# Patient Record
Sex: FEMALE | Race: WHITE | NOT HISPANIC OR LATINO | Employment: OTHER | URBAN - METROPOLITAN AREA
[De-identification: names, ages, dates, MRNs, and addresses within clinical notes are randomized per-mention and may not be internally consistent; named-entity substitution may affect disease eponyms.]

---

## 2017-05-03 ENCOUNTER — COMMUNICATION - HEALTHEAST (OUTPATIENT)
Dept: FAMILY MEDICINE | Facility: CLINIC | Age: 37
End: 2017-05-03

## 2017-09-27 ENCOUNTER — OFFICE VISIT - HEALTHEAST (OUTPATIENT)
Dept: FAMILY MEDICINE | Facility: CLINIC | Age: 37
End: 2017-09-27

## 2017-09-27 ENCOUNTER — COMMUNICATION - HEALTHEAST (OUTPATIENT)
Dept: FAMILY MEDICINE | Facility: CLINIC | Age: 37
End: 2017-09-27

## 2017-09-27 DIAGNOSIS — R10.2 PELVIC PAIN: ICD-10-CM

## 2017-09-27 DIAGNOSIS — N92.0 MENORRHAGIA: ICD-10-CM

## 2017-09-27 ASSESSMENT — MIFFLIN-ST. JEOR: SCORE: 1329.53

## 2017-09-28 ENCOUNTER — HOSPITAL ENCOUNTER (OUTPATIENT)
Dept: ULTRASOUND IMAGING | Facility: CLINIC | Age: 37
Discharge: HOME | End: 2017-09-28
Attending: FAMILY MEDICINE

## 2017-09-28 DIAGNOSIS — R10.2 PELVIC PAIN: ICD-10-CM

## 2018-01-31 ENCOUNTER — COMMUNICATION - HEALTHEAST (OUTPATIENT)
Dept: FAMILY MEDICINE | Facility: CLINIC | Age: 38
End: 2018-01-31

## 2018-02-23 ENCOUNTER — COMMUNICATION - HEALTHEAST (OUTPATIENT)
Dept: HEALTH INFORMATION MANAGEMENT | Facility: CLINIC | Age: 38
End: 2018-02-23

## 2018-02-23 ENCOUNTER — COMMUNICATION - HEALTHEAST (OUTPATIENT)
Dept: TELEHEALTH | Facility: CLINIC | Age: 38
End: 2018-02-23

## 2018-07-17 ENCOUNTER — COMMUNICATION - HEALTHEAST (OUTPATIENT)
Dept: FAMILY MEDICINE | Facility: CLINIC | Age: 38
End: 2018-07-17

## 2018-10-29 ENCOUNTER — RECORDS - HEALTHEAST (OUTPATIENT)
Dept: ADMINISTRATIVE | Facility: OTHER | Age: 38
End: 2018-10-29

## 2019-01-29 ENCOUNTER — RECORDS - HEALTHEAST (OUTPATIENT)
Dept: ADMINISTRATIVE | Facility: OTHER | Age: 39
End: 2019-01-29

## 2019-02-05 ENCOUNTER — RECORDS - HEALTHEAST (OUTPATIENT)
Dept: ADMINISTRATIVE | Facility: OTHER | Age: 39
End: 2019-02-05

## 2019-03-28 ENCOUNTER — COMMUNICATION - HEALTHEAST (OUTPATIENT)
Dept: FAMILY MEDICINE | Facility: CLINIC | Age: 39
End: 2019-03-28

## 2019-03-28 DIAGNOSIS — N92.6 MENSTRUAL PROBLEM: ICD-10-CM

## 2019-04-16 ENCOUNTER — RECORDS - HEALTHEAST (OUTPATIENT)
Dept: ADMINISTRATIVE | Facility: OTHER | Age: 39
End: 2019-04-16

## 2019-04-19 ENCOUNTER — COMMUNICATION - HEALTHEAST (OUTPATIENT)
Dept: FAMILY MEDICINE | Facility: CLINIC | Age: 39
End: 2019-04-19

## 2019-04-24 ENCOUNTER — RECORDS - HEALTHEAST (OUTPATIENT)
Dept: ADMINISTRATIVE | Facility: OTHER | Age: 39
End: 2019-04-24

## 2019-04-30 ENCOUNTER — OFFICE VISIT - HEALTHEAST (OUTPATIENT)
Dept: FAMILY MEDICINE | Facility: CLINIC | Age: 39
End: 2019-04-30

## 2019-04-30 DIAGNOSIS — Z01.818 ENCOUNTER FOR PREOPERATIVE EXAMINATION FOR GENERAL SURGICAL PROCEDURE: ICD-10-CM

## 2019-04-30 DIAGNOSIS — N92.0 MENORRHAGIA WITH REGULAR CYCLE: ICD-10-CM

## 2019-04-30 LAB
ANION GAP SERPL CALCULATED.3IONS-SCNC: 9 MMOL/L (ref 5–18)
BUN SERPL-MCNC: 10 MG/DL (ref 8–22)
CALCIUM SERPL-MCNC: 9.9 MG/DL (ref 8.5–10.5)
CHLORIDE BLD-SCNC: 106 MMOL/L (ref 98–107)
CO2 SERPL-SCNC: 26 MMOL/L (ref 22–31)
CREAT SERPL-MCNC: 0.74 MG/DL (ref 0.6–1.1)
ERYTHROCYTE [DISTWIDTH] IN BLOOD BY AUTOMATED COUNT: 12.3 % (ref 11–14.5)
GFR SERPL CREATININE-BSD FRML MDRD: >60 ML/MIN/1.73M2
GLUCOSE BLD-MCNC: 89 MG/DL (ref 70–125)
HCG UR QL: NEGATIVE
HCT VFR BLD AUTO: 42.1 % (ref 35–47)
HGB BLD-MCNC: 13.9 G/DL (ref 12–16)
MCH RBC QN AUTO: 30.1 PG (ref 27–34)
MCHC RBC AUTO-ENTMCNC: 33 G/DL (ref 32–36)
MCV RBC AUTO: 91 FL (ref 80–100)
PLATELET # BLD AUTO: 224 THOU/UL (ref 140–440)
PMV BLD AUTO: 8.8 FL (ref 7–10)
POTASSIUM BLD-SCNC: 4.5 MMOL/L (ref 3.5–5)
RBC # BLD AUTO: 4.63 MILL/UL (ref 3.8–5.4)
SODIUM SERPL-SCNC: 141 MMOL/L (ref 136–145)
WBC: 6 THOU/UL (ref 4–11)

## 2019-04-30 ASSESSMENT — MIFFLIN-ST. JEOR: SCORE: 1338.6

## 2019-05-08 ENCOUNTER — RECORDS - HEALTHEAST (OUTPATIENT)
Dept: ADMINISTRATIVE | Facility: OTHER | Age: 39
End: 2019-05-08

## 2019-08-08 ENCOUNTER — RECORDS - HEALTHEAST (OUTPATIENT)
Dept: ADMINISTRATIVE | Facility: OTHER | Age: 39
End: 2019-08-08

## 2019-08-14 ENCOUNTER — OFFICE VISIT - HEALTHEAST (OUTPATIENT)
Dept: FAMILY MEDICINE | Facility: CLINIC | Age: 39
End: 2019-08-14

## 2019-08-14 DIAGNOSIS — Z23 NEED FOR PROPHYLACTIC VACCINATION AGAINST HEPATITIS A: ICD-10-CM

## 2019-08-14 DIAGNOSIS — Z01.818 PREOP GENERAL PHYSICAL EXAM: ICD-10-CM

## 2019-08-14 LAB
ERYTHROCYTE [DISTWIDTH] IN BLOOD BY AUTOMATED COUNT: 11.9 % (ref 11–14.5)
HCT VFR BLD AUTO: 41.7 % (ref 35–47)
HGB BLD-MCNC: 14.4 G/DL (ref 12–16)
MCH RBC QN AUTO: 30.5 PG (ref 27–34)
MCHC RBC AUTO-ENTMCNC: 34.6 G/DL (ref 32–36)
MCV RBC AUTO: 88 FL (ref 80–100)
PLATELET # BLD AUTO: 213 THOU/UL (ref 140–440)
PMV BLD AUTO: 8.8 FL (ref 7–10)
RBC # BLD AUTO: 4.72 MILL/UL (ref 3.8–5.4)
WBC: 5.8 THOU/UL (ref 4–11)

## 2019-08-14 ASSESSMENT — MIFFLIN-ST. JEOR: SCORE: 1351.06

## 2019-08-21 ENCOUNTER — COMMUNICATION - HEALTHEAST (OUTPATIENT)
Dept: FAMILY MEDICINE | Facility: CLINIC | Age: 39
End: 2019-08-21

## 2019-09-12 ENCOUNTER — RECORDS - HEALTHEAST (OUTPATIENT)
Dept: ADMINISTRATIVE | Facility: OTHER | Age: 39
End: 2019-09-12

## 2021-01-25 ENCOUNTER — OFFICE VISIT - HEALTHEAST (OUTPATIENT)
Dept: FAMILY MEDICINE | Facility: CLINIC | Age: 41
End: 2021-01-25

## 2021-01-25 DIAGNOSIS — N92.6 MENSTRUAL PROBLEM: ICD-10-CM

## 2021-01-25 DIAGNOSIS — Z13.6 SCREENING FOR CARDIOVASCULAR CONDITION: ICD-10-CM

## 2021-01-25 DIAGNOSIS — Z13.1 SCREENING FOR DIABETES MELLITUS: ICD-10-CM

## 2021-01-25 DIAGNOSIS — Z12.4 SCREENING FOR MALIGNANT NEOPLASM OF CERVIX: ICD-10-CM

## 2021-01-25 DIAGNOSIS — Z00.00 ROUTINE ADULT HEALTH MAINTENANCE: ICD-10-CM

## 2021-01-25 LAB
CHOLEST SERPL-MCNC: 159 MG/DL
ERYTHROCYTE [DISTWIDTH] IN BLOOD BY AUTOMATED COUNT: 11.5 % (ref 11–14.5)
FASTING STATUS PATIENT QL REPORTED: YES
FASTING STATUS PATIENT QL REPORTED: YES
GLUCOSE BLD-MCNC: 90 MG/DL (ref 79–116)
HCT VFR BLD AUTO: 43.1 % (ref 35–47)
HDLC SERPL-MCNC: 72 MG/DL
HGB BLD-MCNC: 14 G/DL (ref 12–16)
LDLC SERPL CALC-MCNC: 77 MG/DL
MCH RBC QN AUTO: 30.1 PG (ref 27–34)
MCHC RBC AUTO-ENTMCNC: 32.5 G/DL (ref 32–36)
MCV RBC AUTO: 93 FL (ref 80–100)
PLATELET # BLD AUTO: 225 THOU/UL (ref 140–440)
PMV BLD AUTO: 8.6 FL (ref 7–10)
RBC # BLD AUTO: 4.65 MILL/UL (ref 3.8–5.4)
TRIGL SERPL-MCNC: 52 MG/DL
WBC: 7 THOU/UL (ref 4–11)

## 2021-01-25 ASSESSMENT — MIFFLIN-ST. JEOR: SCORE: 1267.16

## 2021-01-26 LAB
HPV SOURCE: NORMAL
HUMAN PAPILLOMA VIRUS 16 DNA: NEGATIVE
HUMAN PAPILLOMA VIRUS 18 DNA: NEGATIVE
HUMAN PAPILLOMA VIRUS FINAL DIAGNOSIS: NORMAL
HUMAN PAPILLOMA VIRUS OTHER HR: NEGATIVE
SPECIMEN DESCRIPTION: NORMAL

## 2021-02-03 LAB
BKR LAB AP ABNORMAL BLEEDING: NO
BKR LAB AP BIRTH CONTROL/HORMONES: NORMAL
BKR LAB AP CERVICAL APPEARANCE: NORMAL
BKR LAB AP GYN ADEQUACY: NORMAL
BKR LAB AP GYN INTERPRETATION: NORMAL
BKR LAB AP HPV REFLEX: NORMAL
BKR LAB AP LMP: NORMAL
BKR LAB AP PATIENT STATUS: NORMAL
BKR LAB AP PREVIOUS ABNORMAL: NORMAL
BKR LAB AP PREVIOUS NORMAL: 2018
HIGH RISK?: NO
PATH REPORT.COMMENTS IMP SPEC: NORMAL
RESULT FLAG (HE HISTORICAL CONVERSION): NORMAL

## 2021-02-08 ENCOUNTER — COMMUNICATION - HEALTHEAST (OUTPATIENT)
Dept: OBGYN | Facility: CLINIC | Age: 41
End: 2021-02-08

## 2021-02-15 ENCOUNTER — COMMUNICATION - HEALTHEAST (OUTPATIENT)
Dept: FAMILY MEDICINE | Facility: CLINIC | Age: 41
End: 2021-02-15

## 2021-05-12 ENCOUNTER — RECORDS - HEALTHEAST (OUTPATIENT)
Dept: ADMINISTRATIVE | Facility: OTHER | Age: 41
End: 2021-05-12

## 2021-05-28 NOTE — PROGRESS NOTES
Preoperative Exam    Scheduled Procedure: Uterine Ablation  Surgery Date:  05/08/19  Surgery Location: [facility], fax [phone]    Surgeon:  Dr. Villanueva    Assessment/Plan:     1. Encounter for preoperative examination for general surgical procedure  Basic Metabolic Panel    HM2(CBC w/o Differential)    Pregnancy (Beta-hCG, Qual), Urine   2. Menorrhagia with regular cycle  Basic Metabolic Panel    HM2(CBC w/o Differential)    Pregnancy (Beta-hCG, Qual), Urine       1. Encounter for preoperative examination for general surgical procedure    - Basic Metabolic Panel  - HM2(CBC w/o Differential)  - Pregnancy (Beta-hCG, Qual), Urine    2. Menorrhagia with regular cycle    - Basic Metabolic Panel  - HM2(CBC w/o Differential)  - Pregnancy (Beta-hCG, Qual), Urine        Surgical Procedure Risk: Low (reported cardiac risk generally < 1%)  Have you had prior anesthesia?: Yes  Have you or any family members had a previous anesthesia reaction:  No  Do you or any family members have a history of a clotting or bleeding disorder?: No  Cardiac Risk Assessment: no increased risk for major cardiac complications    Patient approved for surgery with general or local anesthesia.    Please Note:  none, has used bite guard in the past for TMJ    Functional Status: Independent  Patient plans to recover at home with family.     Subjective:      Delia Yusuf is a 38 y.o. female who presents for a preoperative consultation for uterine ablation.  Date of surgery: May 8 at the [facility].  Patient has been dealing with heavy and painful periods for the last 5 years.  She has a total of 4 children all delivered vaginally.  Does have a history of ovarian cyst formation.  Her periods, she does experience fatigue with intermittent dizziness, she does take iron supplementation as needed as well.  She did try oral contraception back in 2017 for a total of 6 months but she did not experience any changes with the  heaviness or severity of her cramping on the oral contraception.  She has been using the diva cup for well over one year.  Over the last year, she has now started wearing a pad with her diva cup because her menstrual periods have become so heavy.  Menstrual period April 24, 2019.  Initial consult for discussing uterine ablation completed roughly 10 days ago with Dr. Villanueva who will be performing the procedure.  Pap smear was completed at her initial consult as well as some additional lab work to rule out any clotting disorders due to her daughter suffering a stroke in utero.  She takes no prescription medications, no history of hypertension, pulmonary embolism or DVT formation.  Current BMI 26.9.  She is a non-smoker.  Vital signs are stable today.    All other systems reviewed and are negative, other than those listed in the HPI.    Pertinent History  Do you have difficulty breathing or chest pain after walking up a flight of stairs: No  History of obstructive sleep apnea: No  Steroid use in the last 6 months: No  Frequent Aspirin/NSAID use: No  Prior Blood Transfusion: No  Prior Blood Transfusion Reaction: No  If for some reason prior to, during or after the procedure, if it is medically indicated, would you be willing to have a blood transfusion?:  There is no transfusion refusal.    No current outpatient medications on file.     No current facility-administered medications for this visit.         No Known Allergies    Patient Active Problem List   Diagnosis   (none) - all problems resolved or deleted       History reviewed. No pertinent past medical history.    Past Surgical History:   Procedure Laterality Date     VT REMOVAL OF OVARIAN CYST(S) Right 3/2006    [place], [place]  Dr Sowmya Tello     WISDOM TOOTH EXTRACTION         Social History     Socioeconomic History     Marital status:      Spouse name: Manuel     Number of children: 4     Years of education: Not on file     Highest  "education level: Not on file   Occupational History     Occupation: Homemaker   Social Needs     Financial resource strain: Not on file     Food insecurity:     Worry: Not on file     Inability: Not on file     Transportation needs:     Medical: Not on file     Non-medical: Not on file   Tobacco Use     Smoking status: Never Smoker     Smokeless tobacco: Never Used   Substance and Sexual Activity     Alcohol use: Yes     Drug use: No     Sexual activity: Yes     Partners: Male   Lifestyle     Physical activity:     Days per week: Not on file     Minutes per session: Not on file     Stress: Not on file   Relationships     Social connections:     Talks on phone: Not on file     Gets together: Not on file     Attends Hindu service: Not on file     Active member of club or organization: Not on file     Attends meetings of clubs or organizations: Not on file     Relationship status: Not on file     Intimate partner violence:     Fear of current or ex partner: Not on file     Emotionally abused: Not on file     Physically abused: Not on file     Forced sexual activity: Not on file   Other Topics Concern     Not on file   Social History Narrative    [employer]       Patient Care Team:  Nina Garsia MD as PCP - General          Objective:     Vitals:    04/30/19 0930   BP: 120/82   Pulse: 93   Resp: 14   Temp: 98.5  F (36.9  C)   SpO2: 97%   Weight: 152 lb (68.9 kg)   Height: 5' 3\" (1.6 m)   PainSc: 0-No pain   LMP: 04/24/2019         Physical Exam:    Review of Systems     Denies fever, chills, visual changes, fatigue, myalgias, nasal congestion, rhinorrhea, ear pain, or discharge, sore throat, swollen glands, breast mass, nipple discharge, breast changes, abdominal pain, cough, shortness of breath, chest pain, weight change, change in bowel habits, melena, rectal bleeding, dysuria, frequency, urgency, hematuria, polyuria, polydipsia, polyphagia, joint pain or swelling or erythema, edema, rash, " "weakness, paresthesias, vaginal discharge or bleeding or mood changes.     Positive: heavy periods  Objective:         /82   Pulse 93   Temp 98.5  F (36.9  C)   Resp 14   Ht 5' 3\" (1.6 m)   Wt 152 lb (68.9 kg)   LMP 04/24/2019 (Approximate)   SpO2 97%   Breastfeeding? No   BMI 26.93 kg/m       Physical Exam:  General Appearance: Alert, cooperative, no distress, appears stated age  Head: Normocephalic, without obvious abnormality, atraumatic  Eyes: PERRL, conjunctiva/corneas clear, EOM's intact  Ears: Normal TM's and external ear canals, both ears  Nose: Nares normal, septum midline,mucosa normal, no drainage  Throat: Lips, mucosa, and tongue normal; teeth and gums normal  Neck: Supple, symmetrical, trachea midline, no adenopathy;  thyroid: not enlarged, symmetric, no tenderness/mass/nodules; no carotid bruit or JVD  Back: Symmetric, no curvature, ROM normal, no CVA tenderness  Lungs: Clear to auscultation bilaterally, respirations unlabored  Heart: Regular rate and rhythm, S1 and S2 normal, no murmur, rub, or gallop, bilateral femoral, pedal and posttibial pulses are equal and palpable, 2+.  No edema  Abdomen: Soft, non-tender, bowel sounds active all four quadrants,  no masses, no organomegaly  Extremities: Extremities normal, atraumatic, no cyanosis or edema  Skin: Skin color, texture, turgor normal, no rashes or lesions  Lymph nodes: Cervical, supraclavicular, and axillary nodes normal  Neurologic: Normal, DTRs intact, equal  strength, face symmetrical, equal shoulder shrug             Patient Instructions     Hold all supplements, aspirin and NSAIDs for 7 days prior to surgery.  Follow your surgeon's direction on when to stop eating and drinking prior to surgery.  Your surgeon will be managing your pain after your surgery.    Remove all jewelry and metal piercings before your surgery.   Remove nail polish from fingers before surgery.          Labs:  Recent Results (from the past 168 hour(s)) "   Basic Metabolic Panel    Collection Time: 04/30/19  9:52 AM   Result Value Ref Range    Sodium 141 136 - 145 mmol/L    Potassium 4.5 3.5 - 5.0 mmol/L    Chloride 106 98 - 107 mmol/L    CO2 26 22 - 31 mmol/L    Anion Gap, Calculation 9 5 - 18 mmol/L    Glucose 89 70 - 125 mg/dL    Calcium 9.9 8.5 - 10.5 mg/dL    BUN 10 8 - 22 mg/dL    Creatinine 0.74 0.60 - 1.10 mg/dL    GFR MDRD Af Amer >60 >60 mL/min/1.73m2    GFR MDRD Non Af Amer >60 >60 mL/min/1.73m2   HM2(CBC w/o Differential)    Collection Time: 04/30/19  9:52 AM   Result Value Ref Range    WBC 6.0 4.0 - 11.0 thou/uL    RBC 4.63 3.80 - 5.40 mill/uL    Hemoglobin 13.9 12.0 - 16.0 g/dL    Hematocrit 42.1 35.0 - 47.0 %    MCV 91 80 - 100 fL    MCH 30.1 27.0 - 34.0 pg    MCHC 33.0 32.0 - 36.0 g/dL    RDW 12.3 11.0 - 14.5 %    Platelets 224 140 - 440 thou/uL    MPV 8.8 7.0 - 10.0 fL   Pregnancy (Beta-hCG, Qual), Urine    Collection Time: 04/30/19  9:54 AM   Result Value Ref Range    Pregnancy Test, Urine Negative Negative       Immunization History   Administered Date(s) Administered     Hep A, Adult IM (19yr & older) 06/18/2015     Influenza, Seasonal, Inj PF IIV3 09/26/2013     Influenza, inj, historic,unspecified 10/22/2008, 10/27/2011     Influenza, seasonal,quad inj 6-35 mos 10/11/2010     Tdap 02/09/2011, 08/14/2013           Electronically signed by Dayna Richardson NP 04/30/19 9:26 AM

## 2021-05-28 NOTE — TELEPHONE ENCOUNTER
New Appointment Needed  What is the reason for the visit:    Pre-Op Appt Request  When is the surgery? :  5.8.2019  Where is the surgery?:   [facility]  Who is the surgeon? :  Nicole  What type of surgery is being done?: uterine ablation  Provider Preference: Any available  How soon do you need to be seen?: before surgery  Waitlist offered?: No  Okay to leave a detailed message:  Yes

## 2021-05-31 ENCOUNTER — RECORDS - HEALTHEAST (OUTPATIENT)
Dept: ADMINISTRATIVE | Facility: CLINIC | Age: 41
End: 2021-05-31

## 2021-05-31 VITALS — BODY MASS INDEX: 26.58 KG/M2 | HEIGHT: 63 IN | WEIGHT: 150 LBS

## 2021-05-31 NOTE — TELEPHONE ENCOUNTER
Who is calling:  Bel  Reason for Call:  Calling regarding H & P for patient. On the H & P please clarify what things the patient is negative for & refax to [phone]. Patient is having surgery on 8/29/19  Date of last appointment with primary care: 8/14/19  Okay to leave a detailed message: Yes

## 2021-05-31 NOTE — PROGRESS NOTES
Assessment/Plan:      Visit for Preoperative Exam.     Patient is approved for surgery and is considered to be low anesthetic risk.  Please call me at [phone] if you have any questions regarding the care of this patient.  Nina Garsia MD      Subjective:     Scheduled Procedure: right shoulder repair   Surgery Date:  8/29/19   Surgery Location:  [facility]   Surgeon:  Dr Nicole     No current outpatient medications on file.     No current facility-administered medications for this visit.        No Known Allergies    Immunization History   Administered Date(s) Administered     Hep A, Adult IM (19yr & older) 06/18/2015, 08/14/2019     Influenza, Seasonal, Inj PF IIV3 09/26/2013     Influenza, inj, historic,unspecified 10/22/2008, 10/27/2011     Influenza, seasonal,quad inj 6-35 mos 10/11/2010     Tdap 02/09/2011, 08/14/2013       Patient Active Problem List   Diagnosis   (none) - all problems resolved or deleted       No past medical history on file.    Social History     Socioeconomic History     Marital status:      Spouse name: Manuel     Number of children: 4     Years of education: Not on file     Highest education level: Not on file   Occupational History     Occupation: Homemaker   Social Needs     Financial resource strain: Not on file     Food insecurity:     Worry: Not on file     Inability: Not on file     Transportation needs:     Medical: Not on file     Non-medical: Not on file   Tobacco Use     Smoking status: Never Smoker     Smokeless tobacco: Never Used   Substance and Sexual Activity     Alcohol use: Yes     Drug use: No     Sexual activity: Yes     Partners: Male   Lifestyle     Physical activity:     Days per week: Not on file     Minutes per session: Not on file     Stress: Not on file   Relationships     Social connections:     Talks on phone: Not on file     Gets together: Not on file     Attends Quaker service: Not on file     Active member of club or  organization: Not on file     Attends meetings of clubs or organizations: Not on file     Relationship status: Not on file     Intimate partner violence:     Fear of current or ex partner: Not on file     Emotionally abused: Not on file     Physically abused: Not on file     Forced sexual activity: Not on file   Other Topics Concern     Not on file   Social History Narrative    [employer]       Past Surgical History:   Procedure Laterality Date     ENDOMETRIAL ABLATION  05/2019    Dr Yadi Villanueva     ME REMOVAL OF OVARIAN CYST(S) Right 3/2006    [place], [place]  Dr Sowmya Tello     WISDOM TOOTH EXTRACTION         Family History   Problem Relation Age of Onset     Cancer Paternal Grandfather         Bladder cancer and skin cancer     Hypertension Paternal Grandfather      Melanoma Paternal Grandmother      Hypertension Paternal Grandmother      Stroke Maternal Grandfather          2 STROKES     Hypertension Maternal Grandfather      Hypertension Maternal Grandmother      Depression Sister      No Medical Problems Mother      No Medical Problems Father      No Medical Problems Brother      No Medical Problems Sister          HPI: This healthy 38-year-old patient has had problems with her right shoulder.  She dislocated her right shoulder in January and then has had 2 more partial dislocations since.  She is undergoing physical therapy to help improve the strength in her arm.  She is right-handed.  She has been fully evaluated by the orthopedic surgeon who recommends surgery and this is all been explained to her well and she has no further questions.  He has done everything she can to improve her situation.    History of Present Illness  Recent Health  Fever: no  Chills: no  Fatigue: no  Chest Pain: no  Cough: no  Dyspnea: no  Urinary Frequency: no  Nausea: no  Vomiting: no  Diarrhea: no  Abdominal Pain: no  Easy Bruising: no  Lower Extremity Swelling: no  Poor Exercise Tolerance:  "no    Pertinent History  Prior Anesthesia: yes  Previous Anesthesia Reaction:  no  Diabetes: no  Cardiovascular Disease: no  Pulmonary Disease: no  Renal Disease: no  GI Disease: no  Sleep Apnea: no  Thromboembolic Problems: no  Clotting Disorder: no  Bleeding Disorder: no  Transfusion Reaction: no  Impaired Immunity: no  Steroid use in the last 6 months: no  Frequent Aspirin use: no    No family history of anesthetic reactions.    After surgery, the patient plans to recover at home with family.    Review of Systems, patient denies:    CONST: Fevers, chills, sweats, fatigue, appetite or weight change, temperature intolerance  EYES: Double vision, blurry vision, pain, redness  ENT: Drainage, congestion, nosebleeds, sinus problems, sores on lips/mouth/tongue/throat, dental work, change in voice  RESP: Cough, shortness of breath, wheezing, asthma  BREAST/SKIN: Lumps, pain, drainage sores, rash  CVS: Chest pain, heart murmur, DVT, PE, edema, palpitations  GI: Swallowing difficulties, heartburn, abdominal pain, nausea, vomiting, diarrhea, constipation, black or bloody stools, hemorrhoids  URINARY: Problems urinating, frequent urination  REPRODUCTIVE:  Abnormal bleeding, discharge, sore, pregnancies, postmenopausal, hysterectomy, contraception  MSK:  swelling, stiffness, back or neck pain  ENDO: Changes in hair/skin, excessive thirst, urination, diabetes  LYMPH/HEME: Other masses, swelling, unusual bruising or bleeding  NEURO: Headache, head injury, blackout, confusion, seizure, difficulty with vision, speech, walking, weakness in arms/feet/face  MENTAL HEALTH: Anxiety, depression, insomnia, abuse    POSITIVES:  Right shoulder pain    Objective:       Vitals:    08/14/19 0958   BP: 114/86   Pulse: 85   Temp: 98.6  F (37  C)   SpO2: 98%   Weight: 153 lb (69.4 kg)   Height: 5' 3.5\" (1.613 m)     Body mass index is 26.68 kg/m .      Physical Exam:  General Appearance: Alert, cooperative, no distress, appears stated " age  Head: Normocephalic, without obvious abnormality, atraumatic  Eyes: PERRL, conjunctiva/corneas clear, EOM's intact  Ears: Normal TM's and external ear canals, both ears  Nose: Nares normal, septum midline,mucosa normal, no drainage  Throat: Lips, mucosa, and tongue normal; teeth and gums normal  Neck: Supple, symmetrical, trachea midline, no adenopathy;  thyroid: not enlarged, symmetric, no tenderness/mass/nodules; no carotid bruit or JVD  Back: Symmetric, no curvature, ROM normal, no CVA tenderness  Lungs: Clear to auscultation bilaterally, respirations unlabored  Heart: Regular rate and rhythm, S1 and S2 normal, no murmur, rub, or gallop  Abdomen: Soft, non-tender, bowel sounds active all four quadrants,  no masses, no organomegaly  Extremities: Extremities normal, atraumatic, no cyanosis or swelling.  I did not specifically evaluate her right shoulder.  Skin: Skin color, texture, turgor normal, no rashes or lesions  Lymph nodes: Cervical, supraclavicular, and axillary nodes normal  Neurologic: Normal        Recent Results (from the past 240 hour(s))   HM2(CBC w/o Differential)   Result Value Ref Range    WBC 5.8 4.0 - 11.0 thou/uL    RBC 4.72 3.80 - 5.40 mill/uL    Hemoglobin 14.4 12.0 - 16.0 g/dL    Hematocrit 41.7 35.0 - 47.0 %    MCV 88 80 - 100 fL    MCH 30.5 27.0 - 34.0 pg    MCHC 34.6 32.0 - 36.0 g/dL    RDW 11.9 11.0 - 14.5 %    Platelets 213 140 - 440 thou/uL    MPV 8.8 7.0 - 10.0 fL         1. Preop general physical exam    - HM2(CBC w/o Differential)    2. Need for prophylactic vaccination against hepatitis A    - Hepatitis A adult 2 dose IM (19 yr & older)        Nina Garsia MD

## 2021-05-31 NOTE — TELEPHONE ENCOUNTER
"Review of systems looks all positive. Please rewrite to add \"negative for the following\"  And refax.     Spoke with DR Garsia. She is understanding of what's needed.  "

## 2021-06-01 ENCOUNTER — RECORDS - HEALTHEAST (OUTPATIENT)
Dept: ADMINISTRATIVE | Facility: CLINIC | Age: 41
End: 2021-06-01

## 2021-06-02 ENCOUNTER — RECORDS - HEALTHEAST (OUTPATIENT)
Dept: ADMINISTRATIVE | Facility: CLINIC | Age: 41
End: 2021-06-02

## 2021-06-03 VITALS — WEIGHT: 153 LBS | BODY MASS INDEX: 26.12 KG/M2 | HEIGHT: 64 IN

## 2021-06-03 VITALS — BODY MASS INDEX: 26.93 KG/M2 | HEIGHT: 63 IN | WEIGHT: 152 LBS

## 2021-06-05 VITALS
HEART RATE: 89 BPM | DIASTOLIC BLOOD PRESSURE: 80 MMHG | BODY MASS INDEX: 24.14 KG/M2 | WEIGHT: 136.25 LBS | OXYGEN SATURATION: 98 % | HEIGHT: 63 IN | SYSTOLIC BLOOD PRESSURE: 120 MMHG

## 2021-06-13 NOTE — PROGRESS NOTES
PROGRESS NOTE       SUBJECTIVE:  Delia Yusuf is a 37 y.o. female   Chief Complaint   Patient presents with     Abdominal Pain     right side pain, heavy periods , pain comes and goes, but on going for 1 month , no problems with constipations or urination,      Menstrual Problem     over the last year      This 37-year-old patient is  6 para 4 spontaneous AB 2.  Her deliveries were all vaginal.  She has a history of ovarian cysts but has never required surgery for them.  She had an ultrasound of her pelvis 2015 for right-sided pelvic pain which was normal.  She states that she has had this pain off and on for about a month.  (She has NOT had it persistently for 2 years.)  The pain has not been severe.  It comes and goes.  It is not sharp but it is kind of stays the same.  She recalls a 10 cm cyst in  which resolved finally.  She denies any fever chills.  She has had no nausea or vomiting.  She does have very heavy periods and at times gushes with heavy flow.  She is interested in doing something to treat this.  I outlined for her possibilities such as: An ablation, medicated IUD, or birth control pills.  Her  has had a vasectomy.  Patient thinks that she would like to consider an ablation.    Patient Active Problem List   Diagnosis   (none) - all problems resolved or deleted       Current Outpatient Prescriptions   Medication Sig Dispense Refill     norgestimate-ethinyl estradiol (SPRINTEC, 28,) 0.25-35 mg-mcg per tablet Take 1 tablet by mouth daily. 84 tablet 3     No current facility-administered medications for this visit.        History   Smoking Status     Never Smoker   Smokeless Tobacco     Not on file       REVIEW OF SYSTEMS:  Patient denies fever, chills, dizziness, headache, visual change, cough, chest pain, shortness of breath, abdominal pain, edema.     OBJECTIVE:       Vitals:    17 1541   BP: 122/82   Pulse: 88     Weight: 150 lb (68 kg)  Wt Readings from Last 3  Encounters:   09/27/17 150 lb (68 kg)   03/18/16 148 lb 9 oz (67.4 kg)   12/14/15 145 lb (65.8 kg)     Body mass index is 26.57 kg/(m^2).        Physical Exam:  GENERAL APPEARANCE: A&A, NAD, well hydrated, well nourished  SKIN:  Normal skin turgor, no lesions/rashes  NECK: Supple, without lymphadenopathy, no thyroid mass  CV: RRR, no M/G/R   LUNGS: CTAB, normal respiratory effort  ABDOMEN: S&NT, no masses, no organomegaly   EXTREMITY: no edema   NEURO: no gross deficits   PSYCHIATRIC:  Mood appropriate, memory intact        ASSESSMENT/PLAN:     1. Pelvic pain  Since it has been 2 years since she had the pelvic ultrasound I recommend repeating it.  The other possibility would be a femoral hernia developing but the location does not quite fit.  - US Pelvis With Transvaginal Non OB; Future    2. Menorrhagia    - Hemoglobin  I will inform patient of results when available.        The visit lasted a total of 28 minutes face to face with the patient.  Over 50% of the time spent counseling and educating the patient about all of the above.      Nina Garsia MD

## 2021-06-14 NOTE — PROGRESS NOTES
Assessment & Plan:        1. Routine adult health maintenance    2. Screening for malignant neoplasm of cervix  - Gynecologic Cytology (PAP Smear)  - HPV High Risk DNA Cervical    3. Menstrual problem  - HM2(CBC w/o Differential)    4. Screening for cardiovascular condition  - Lipid [place] FASTING    5. Screening for diabetes mellitus  - Glucose       Patient Counseling:    --Nutrition: Stressed importance of moderation in sodium/caffeine intake, saturated fat and cholesterol, caloric balance, sufficient intake of fresh fruits, vegetables, calcium, and iron.    --Discussed the importance of vitamin D and calcium supplementation/intake, and the risks and benefits of daily use of baby aspirin.   --Family planning:reviewed contraceptive options, supplementing with folate and DHA and review of prescription medications when considering pregnancy.   --Exercise: Stressed the importance of regular weight-bearing exercise    --Substance Abuse: limit alcohol use    --Injury prevention: Discussed safety belts, distracted driving, fire safety    --Dental health: Discussed importance of regular tooth brushing, flossing, and dental visits.    --Discussed benefits of screening colonoscopy, mammography and the recommended intervals.     --Discussed pap frequency and indications for stopping screening.   --Discussed risks and benefits of regular breast self exams and evaluation with any changes    --Immunizations reviewed   --Advances Directives were reviewed and she was given a copy of the Honoring Choices Document.       Discussed the patient's BMI with her. The BMI is in the acceptable range      I have had an Advance Directives discussion with the patient.     Subjective:         Delia Yusuf is a 40 y.o. female who presents for annual exam.   The patient reports no concerns.       Fasting today? Yes       Has the patient ever been transfused or tattooed?: no.      Do you have pain that bothers you in your daily life?  "no       The patient reports that there is not domestic violence in her life.     Gynecologic History     LMP: Patient's last menstrual period was 2021 (approximate).    Contraception: none. vasectomy  The patient is sexually active.  Last Pap: 2019. Results were: abnormal  Abnormal pap history? yes  Last mammogram: n/a. Results were: n/a  : 6  Para: 4024    Healthy Habits:   Regular Exercise: No  Sunscreen Use: Yes  Healthy Diet: Yes  Dental Visits Regularly: Yes  Seat Belt: Yes  Sexually active: Yes  Self Breast Exam Monthly:Yes  Colonoscopy: No  Lipid Profile: Yes  Glucose Screen: Yes  Prevention of Osteoporosis: Yes  Last Dexa: No  Guns at Home:  Yes  Guns Safety Locks:  Yes      Immunization History   Administered Date(s) Administered     Hep A, Adult IM (19yr & older) 2015, 2019     Influenza, Seasonal, Inj PF IIV3 2013     Influenza, inj, historic,unspecified 10/22/2008, 10/27/2011     Influenza, seasonal,quad inj 6-35 mos 10/11/2010     Tdap 2011, 2013     Immunization status: up to date and documented.    The following portions of the patient's history were reviewed and updated as appropriate: allergies, current medications, past family history, past medical history, past social history, past surgical history and problem list.    Review of Systems  A 12 point comprehensive review of systems was negative except as noted.          Objective:        Vitals:    21 1121   BP: 120/80   Pulse: 89   SpO2: 98%   Weight: 136 lb 4 oz (61.8 kg)   Height: 5' 3\" (1.6 m)   LMP: 2021      Body mass index is 24.14 kg/m .  General: alert, pleasant, and no distress  Head: Normocephalic, without obvious abnormality, atraumatic  Eyes: conjunctivae and sclerae normal and pupils equal, round, reactive to   light and accomodation  Ears: normal TM's and external ear canals both ears  Nose: no discharge, no sinus tenderness  Throat: lips, mucosa, and tongue normal; teeth " and gums normal  Neck: no adenopathy, supple, symmetrical, trachea midline and thyroid normal  Back: symmetric, ROM normal. No CVA tenderness.  Lungs: clear to auscultation bilaterally  Breasts: normal appearance, no masses or tenderness  Heart : regular rate and rhythm and no murmurs  Abdomen:  bowel sounds normal, no masses palpable and soft, non-tender  Pelvic: external genitalia normal,  vagina normal without discharge, cervix normal in appearance,   no cervical motion tenderness  Extremities: extremities normal, atraumatic, no cyanosis or edema  Pulses: 2+ and symmetric  Skin: Skin color, texture, turgor normal. No rashes or lesions  Lymph nodes: Cervical, supraclavicular, and axillary nodes normal.  Neurologic: Grossly normal              Results for orders placed or performed in visit on 01/25/21   Lipid [place] FASTING   Result Value Ref Range    Cholesterol 159 <=199 mg/dL    Triglycerides 52 <=149 mg/dL    HDL Cholesterol 72 >=50 mg/dL    LDL Calculated 77 <=129 mg/dL    Patient Fasting > 8hrs? Yes    Glucose   Result Value Ref Range    Glucose 90 79 - 116 mg/dL    Patient Fasting > 8hrs? Yes    HM2(CBC w/o Differential)   Result Value Ref Range    WBC 7.0 4.0 - 11.0 thou/uL    RBC 4.65 3.80 - 5.40 mill/uL    Hemoglobin 14.0 12.0 - 16.0 g/dL    Hematocrit 43.1 35.0 - 47.0 %    MCV 93 80 - 100 fL    MCH 30.1 27.0 - 34.0 pg    MCHC 32.5 32.0 - 36.0 g/dL    RDW 11.5 11.0 - 14.5 %    Platelets 225 140 - 440 thou/uL    MPV 8.6 7.0 - 10.0 fL   HPV High Risk DNA Cervical   Result Value Ref Range    HPV Source [facility]     HPV16 DNA Negative NEG    HPV18 DNA Negative NEG    Other HR HPV Negative NEG    Final Diagnosis SEE NOTES     Specimen Description Cervical Cells

## 2021-06-21 NOTE — LETTER
Letter by Cami Arteaga RN at      Author: Cami Arteaga RN Service: -- Author Type: --    Filed:  Encounter Date: 2/15/2021 Status: (Other)         Delia CJ Yusuf  [address] [place] [address]             February 15, 2021         Dear Ms. Madelin,    We are happy to inform you that your recent Pap smear and Human Papillomavirus (HPV) test results are normal and negative.    It is recommended that you have your next Pap smear and Human Papillomavirus (HPV) test in 1 year. You will also need to return to the clinic every year for an annual wellness visit.    If you have additional questions regarding this result, please contact our office and we will be happy to assist you.      Sincerely,    Your [facility] Care Team

## 2021-06-26 ENCOUNTER — HEALTH MAINTENANCE LETTER (OUTPATIENT)
Age: 41
End: 2021-06-26

## 2021-10-14 ENCOUNTER — TRANSFERRED RECORDS (OUTPATIENT)
Dept: HEALTH INFORMATION MANAGEMENT | Facility: CLINIC | Age: 41
End: 2021-10-14

## 2021-10-16 ENCOUNTER — HEALTH MAINTENANCE LETTER (OUTPATIENT)
Age: 41
End: 2021-10-16

## 2022-01-06 ENCOUNTER — PATIENT OUTREACH (OUTPATIENT)
Dept: FAMILY MEDICINE | Facility: CLINIC | Age: 42
End: 2022-01-06
Payer: COMMERCIAL

## 2022-01-06 PROBLEM — R87.619 ABNORMAL PAP SMEAR OF CERVIX: Status: ACTIVE | Noted: 2019-01-01

## 2022-01-06 NOTE — LETTER
January 6, 2022      Delia Yusuf  [address] [facility] [address]        Dear ,    This letter is to remind you that you are due for your follow-up Pap smear and Human Papillomavirus (HPV) test.    Please call [phone] to schedule your appointment at your earliest convenience.    If you have completed the appointment outside of the [facility] system, please have the records forwarded to our office. We will update your chart for your provider to review before your next annual wellness visit.     Thank you for choosing [facility]!      Sincerely,    Your [facility] Care Team

## 2022-02-10 ENCOUNTER — OFFICE VISIT (OUTPATIENT)
Dept: OTOLARYNGOLOGY | Facility: CLINIC | Age: 42
End: 2022-02-10
Payer: COMMERCIAL

## 2022-02-10 DIAGNOSIS — K21.9 GASTROESOPHAGEAL REFLUX DISEASE, UNSPECIFIED WHETHER ESOPHAGITIS PRESENT: ICD-10-CM

## 2022-02-10 DIAGNOSIS — R09.A2 GLOBUS SENSATION: Primary | ICD-10-CM

## 2022-02-10 PROCEDURE — 31575 DIAGNOSTIC LARYNGOSCOPY: CPT | Performed by: OTOLARYNGOLOGY

## 2022-02-10 PROCEDURE — 99203 OFFICE O/P NEW LOW 30 MIN: CPT | Mod: 25 | Performed by: OTOLARYNGOLOGY

## 2022-02-10 RX ORDER — PANTOPRAZOLE SODIUM 40 MG/1
TABLET, DELAYED RELEASE ORAL
Qty: 90 TABLET | Refills: 0 | Status: SHIPPED | OUTPATIENT
Start: 2022-02-10 | End: 2023-05-09

## 2022-02-10 NOTE — PROGRESS NOTES
CHIEF COMPLAINT:  Throat Concern      HISTORY OF PRESENT ILLNESS    Delia was seen at the behest of Delia Barry MD for globus sensation.   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   Globus sensation since mid December.  Tried 12 days of omerprazole with no relief.  No change in diet.  Drinks several cans of soda a week.   No fizzy drinks.  No juices.  No history of seasonal allergies.   had COVID in December.     RSI = 6         REVIEW OF SYSTEMS    Review of Systems as per HPI and PMHx, otherwise 10 system review system are negative.     Patient has no known allergies.     There were no vitals taken for this visit.    HEAD: Normal appearance and symmetry:  No cutaneous lesions.      NECK:  supple     EARS: normal TM, EACs     NOSE:     Dorsum:   straight  Septum:  posterior spur  Mucosa:  moist  Inferior turbinates:  wnl        ORAL CAVITY/OROPHARYNX:     Lips:  Normal.  Tongue: normal, midline  Mucosa:   no lesions  Tonsils:  1+     NECK:  Trachea:  midline.              Thyroid:  normal              Adenopathy:  none        NEURO:   Alert and Oriented     GAIT AND STATION:  normal     RESPIRATORY:   Symmetry and Respiratory effort     PSYCH:  Normal mood and affect     SKIN:   warm and dry         FLEX LARYNGOSCOPY:    After obtaining consent, a flexible laryngoscope is used to examine both nasal cavities, the nasopharynx, pharnx, and larynx.      Nasal cavity: wnl  NP: wnl  Pharnx: wnl  Larynx: arytenoid mucosa with moderate erythema    IMPRESSION:    Encounter Diagnoses   Name Primary?     Globus sensation Yes     Gastroesophageal reflux disease, unspecified whether esophagitis present           RECOMMENDATIONS:      Orders Placed This Encounter   Procedures     LARYNGOSCOPY FLEX FIBEROPTIC, DIAGNOSTIC     XR Esophagram     Adult General Surg Referral      Trial of protonix

## 2022-02-10 NOTE — PATIENT INSTRUCTIONS
Acid Suppression Treatment    Protonix as directed    Lifestyle changes:    Avoid eating 2-3 hours before bedtime.   You may find it helpful to elevate the head of your bed.     Avoid following foods that are likely to trigger acid reflux:    Coffee or tea (try LOW ACID coffee or herbal tea)  Anything that s fizzy or has caffeine in it  Alcohol   Citrus fruits, such as oranges and zayra  Tomato based foods (salsa, pizza, lasanga)  Chocolate   Mint or peppermint  Fatty foods (ice cream)  Spicy foods  Onions and garlic

## 2022-02-10 NOTE — LETTER
2/10/2022         RE: Delia Yusuf  [address] [facility] [address]        Dear Colleague,    Thank you for referring your patient, Delia Yusuf, to the [facility]. Please see a copy of my visit note below.    CHIEF COMPLAINT:  Throat Concern      HISTORY OF PRESENT ILLNESS    Delia was seen at the behest of Delia Barry MD for globus sensation.   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  Tried 12 days of omerprazole with no relief.  No change in diet.  Drinks several cans of soda a week.   No fizzy drinks.  No juices.  No history of seasonal allergies.   had COVID in December.     RSI = 6         REVIEW OF SYSTEMS    Review of Systems as per HPI and PMHx, otherwise 10 system review system are negative.     Patient has no known allergies.     There were no vitals taken for this visit.    HEAD: Normal appearance and symmetry:  No cutaneous lesions.      NECK:  supple     EARS: normal TM, EACs     NOSE:     Dorsum:   straight  Septum:  posterior spur  Mucosa:  moist  Inferior turbinates:  wnl        ORAL CAVITY/OROPHARYNX:     Lips:  Normal.  Tongue: normal, midline  Mucosa:   no lesions  Tonsils:  1+     NECK:  Trachea:  midline.              Thyroid:  normal              Adenopathy:  none        NEURO:   Alert and Oriented     GAIT AND STATION:  normal     RESPIRATORY:   Symmetry and Respiratory effort     PSYCH:  Normal mood and affect     SKIN:   warm and dry         FLEX LARYNGOSCOPY:    After obtaining consent, a flexible laryngoscope is used to examine both nasal cavities, the nasopharynx, pharnx, and larynx.      Nasal cavity: wnl  NP: wnl  Pharnx: wnl  Larynx: arytenoid mucosa with moderate erythema    IMPRESSION:    Encounter Diagnoses   Name Primary?     Globus sensation Yes     Gastroesophageal reflux disease, unspecified whether esophagitis present           RECOMMENDATIONS:      Orders Placed This Encounter    Procedures     LARYNGOSCOPY FLEX FIBEROPTIC, DIAGNOSTIC     XR Esophagram     Adult General Surg Referral      Trial of protonix        Again, thank you for allowing me to participate in the care of your patient.        Sincerely,        Sajan Bartlett MD

## 2022-02-24 ENCOUNTER — HOSPITAL ENCOUNTER (OUTPATIENT)
Dept: RADIOLOGY | Facility: CLINIC | Age: 42
Discharge: HOME | End: 2022-02-24
Attending: OTOLARYNGOLOGY | Admitting: OTOLARYNGOLOGY
Payer: COMMERCIAL

## 2022-02-24 DIAGNOSIS — R09.A2 GLOBUS SENSATION: ICD-10-CM

## 2022-02-24 PROCEDURE — 74220 X-RAY XM ESOPHAGUS 1CNTRST: CPT

## 2022-02-24 PROCEDURE — 255N000001 HC RX 255: Performed by: OTOLARYNGOLOGY

## 2022-02-24 RX ADMIN — ANTACID/ANTIFLATULENT 4 G: 380; 550; 10; 10 GRANULE, EFFERVESCENT ORAL at 09:05

## 2022-03-07 ENCOUNTER — OFFICE VISIT (OUTPATIENT)
Dept: SURGERY | Facility: CLINIC | Age: 42
End: 2022-03-07
Attending: OTOLARYNGOLOGY
Payer: COMMERCIAL

## 2022-03-07 VITALS — BODY MASS INDEX: 26.75 KG/M2 | WEIGHT: 151 LBS | HEIGHT: 63 IN

## 2022-03-07 DIAGNOSIS — K21.9 GASTROESOPHAGEAL REFLUX DISEASE, UNSPECIFIED WHETHER ESOPHAGITIS PRESENT: ICD-10-CM

## 2022-03-07 PROCEDURE — 99203 OFFICE O/P NEW LOW 30 MIN: CPT | Performed by: SURGERY

## 2022-03-07 NOTE — LETTER
3/7/2022         RE: Delia Yusuf  [address] [facility] [address]        Dear Colleague,    Thank you for referring your patient, Delia Yusuf, to the [facility] [place]. Please see a copy of my visit note below.    HPI: Delia Yusuf is a 41 year old female referred to see me by Delia Barry for evaluation of gastroesophageal reflux disease.  She notes  a several month history of a globus sensation with intermittent esophageal burning.  She states all of her symptoms started after a bout with Covid.  Since then she has had thickened phlegm and globus sensation but denies any real significant reflux symptomatology such as significant esophageal burning, waterbrash, nighttime cough or dysphagia.  She is currently on Protonix and states this helps intermittently.  She did see ENT and underwent an esophagram evaluation.  This demonstrated reflux throughout the entire procedure.    Allergies:Patient has no known allergies.    Past Medical History:   Diagnosis Date     Abnormal Pap smear of cervix 1/1/2019 2010, 2013, 2015 NIL 4/2019 abnormal pap? No records 1/25/21 NIL pap, neg HPV. Plan: await provider       Past Surgical History:   Procedure Laterality Date     ENDOMETRIAL ABLATION  05/2019    Dr Yadi Villanueva     HC REMOVAL OF OVARIAN CYST(S) Right 03/2006    [place], [place]  Dr Sowmya Tello     SHOULDER ARTHROSCOPY W/ BANKHART PROCEDURE Right      WISDOM TOOTH EXTRACTION         CURRENT MEDS:    Current Outpatient Medications:      pantoprazole (PROTONIX) 40 MG EC tablet, Take 30 minutes before breakfast, Disp: 90 tablet, Rfl: 0    Family History   Problem Relation Age of Onset     Cancer Paternal Grandfather         Bladder cancer and skin cancer     Hypertension Paternal Grandfather      Melanoma Paternal Grandmother      Hypertension Paternal Grandmother      Cerebrovascular Disease Maternal Grandfather          2 STROKES      "Hypertension Maternal Grandfather      Hypertension Maternal Grandmother      Depression Sister      No Known Problems Mother      No Known Problems Father      No Known Problems Brother      No Known Problems Sister         reports that she has never smoked. She has never used smokeless tobacco. She reports current alcohol use. She reports that she does not use drugs.    Review of Systems:  The 12 system review is within normal limits except for as mentioned above.  General ROS: No complaints or constitutional symptoms  Ophthalmic ROS: No complaints of visual changes  Skin: No complaints or symptoms   Endocrine: No complaints or symptoms  Hematologic/Lymphatic: No symptoms or complaints  Psychiatric: No symptoms or complaints  Respiratory ROS: no cough, shortness of breath, or wheezing  Cardiovascular ROS: no chest pain or dyspnea on exertion  Gastrointestinal ROS: As per HPI  Genito-Urinary ROS: no dysuria, trouble voiding, or hematuria  Musculoskeletal ROS: no joint or muscle pain  Neurological ROS: no TIA or stroke symptoms      EXAM:  Ht 1.61 m (5' 3.39\")   Wt 68.5 kg (151 lb)   BMI 26.42 kg/m    GENERAL: Well developed female, No acute distress, pleasant and conversant   EYES: Pupils equal, round and reactive, no scleral icterus  ABDOMEN: Soft, nontender, nondistended  SKIN: Pink, warm and dry, no obvious rashes or lesions   NEURO:No focal deficits, ambulatory  MUSCULOSKELETAL:No obvious deformities, no swelling, normal appearing      LABS:  Lab Results   Component Value Date    WBC 7.0 01/25/2021    HGB 14.0 01/25/2021    HCT 43.1 01/25/2021    MCV 93 01/25/2021     01/25/2021     INR/Prothrombin Time  @LABRCNTIP(NA,K,CL,co2,bun,creatinine,labglom,glucose,calcium)@  No results found for: ALT, AST, GGT, ALKPHOS, BILITOT    IMAGES:   Relevant images were reviewed and discussed with the patient.  Notable findings were: Esophagram evaluation pending results noted which demonstrate reflux throughout the " entire procedure    Assessment/Plan:   Delia Yusuf is a 41 year old female with signs and symptoms consistent with gastroesophageal reflux noted on esophagram with minimal symptoms.  I have explained the pathophysiology of gastroesophageal reflux disease in detail as well as the surgical versus non-operative management strategies.      There has been some discussion regarding association between Covid infection and increased reflux.  It is clear on the esophagram that she does have gastroesophageal reflux but this is not creating any significant symptomatology given her history.  We discussed the options of continued work-up including an EGD and pH probe.  We also discussed the options of observation and dietary lifestyle management strategies with continue medication for the next several months  She would like to pursue the observational strategy at this point.  If her symptoms continue or do not improve over the next several months she will follow-up with me and we will schedule an EGD for evaluation.    Adam Matute DO [facility]  [phone]  [facility] Department of Surgery      Again, thank you for allowing me to participate in the care of your patient.        Sincerely,        Adam Matute DO

## 2022-03-07 NOTE — PROGRESS NOTES
HPI: Delia Yusuf is a 41 year old female referred to see me by Delia Barry for evaluation of gastroesophageal reflux disease.  She notes  a several month history of a globus sensation with intermittent esophageal burning.  She states all of her symptoms started after a bout with Covid.  Since then she has had thickened phlegm and globus sensation but denies any real significant reflux symptomatology such as significant esophageal burning, waterbrash, nighttime cough or dysphagia.  She is currently on Protonix and states this helps intermittently.  She did see ENT and underwent an esophagram evaluation.  This demonstrated reflux throughout the entire procedure.    Allergies:Patient has no known allergies.    Past Medical History:   Diagnosis Date     Abnormal Pap smear of cervix 1/1/2019 2010, 2013, 2015 NIL 4/2019 abnormal pap? No records 1/25/21 NIL pap, neg HPV. Plan: await provider       Past Surgical History:   Procedure Laterality Date     ENDOMETRIAL ABLATION  05/2019    Dr Yadi Villanueva      REMOVAL OF OVARIAN CYST(S) Right 03/2006    [place], [place]  Dr Sowmya Tello     SHOULDER ARTHROSCOPY W/ BANKHART PROCEDURE Right      WISDOM TOOTH EXTRACTION         CURRENT MEDS:    Current Outpatient Medications:      pantoprazole (PROTONIX) 40 MG EC tablet, Take 30 minutes before breakfast, Disp: 90 tablet, Rfl: 0    Family History   Problem Relation Age of Onset     Cancer Paternal Grandfather         Bladder cancer and skin cancer     Hypertension Paternal Grandfather      Melanoma Paternal Grandmother      Hypertension Paternal Grandmother      Cerebrovascular Disease Maternal Grandfather          2 STROKES     Hypertension Maternal Grandfather      Hypertension Maternal Grandmother      Depression Sister      No Known Problems Mother      No Known Problems Father      No Known Problems Brother      No Known Problems Sister         reports that she has never smoked. She has never used  "smokeless tobacco. She reports current alcohol use. She reports that she does not use drugs.    Review of Systems:  The 12 system review is within normal limits except for as mentioned above.  General ROS: No complaints or constitutional symptoms  Ophthalmic ROS: No complaints of visual changes  Skin: No complaints or symptoms   Endocrine: No complaints or symptoms  Hematologic/Lymphatic: No symptoms or complaints  Psychiatric: No symptoms or complaints  Respiratory ROS: no cough, shortness of breath, or wheezing  Cardiovascular ROS: no chest pain or dyspnea on exertion  Gastrointestinal ROS: As per HPI  Genito-Urinary ROS: no dysuria, trouble voiding, or hematuria  Musculoskeletal ROS: no joint or muscle pain  Neurological ROS: no TIA or stroke symptoms      EXAM:  Ht 1.61 m (5' 3.39\")   Wt 68.5 kg (151 lb)   BMI 26.42 kg/m    GENERAL: Well developed female, No acute distress, pleasant and conversant   EYES: Pupils equal, round and reactive, no scleral icterus  ABDOMEN: Soft, nontender, nondistended  SKIN: Pink, warm and dry, no obvious rashes or lesions   NEURO:No focal deficits, ambulatory  MUSCULOSKELETAL:No obvious deformities, no swelling, normal appearing      LABS:  Lab Results   Component Value Date    WBC 7.0 01/25/2021    HGB 14.0 01/25/2021    HCT 43.1 01/25/2021    MCV 93 01/25/2021     01/25/2021     INR/Prothrombin Time  @LABRCNTIP(NA,K,CL,co2,bun,creatinine,labglom,glucose,calcium)@  No results found for: ALT, AST, GGT, ALKPHOS, BILITOT    IMAGES:   Relevant images were reviewed and discussed with the patient.  Notable findings were: Esophagram evaluation pending results noted which demonstrate reflux throughout the entire procedure    Assessment/Plan:   Delia Yusuf is a 41 year old female with signs and symptoms consistent with gastroesophageal reflux noted on esophagram with minimal symptoms.  I have explained the pathophysiology of gastroesophageal reflux disease in detail as well as the " surgical versus non-operative management strategies.      There has been some discussion regarding association between Covid infection and increased reflux.  It is clear on the esophagram that she does have gastroesophageal reflux but this is not creating any significant symptomatology given her history.  We discussed the options of continued work-up including an EGD and pH probe.  We also discussed the options of observation and dietary lifestyle management strategies with continue medication for the next several months  She would like to pursue the observational strategy at this point.  If her symptoms continue or do not improve over the next several months she will follow-up with me and we will schedule an EGD for evaluation.    Adam Matute DO FACS  [phone]  [facility] Department of Surgery

## 2022-03-10 NOTE — TELEPHONE ENCOUNTER
Kameron Barry,   Patient is lost to pap tracking follow-up. Attempts to contact pt have been made per reminder process and there has been no reply and/or no appt scheduled.     Pap Hx:  2010, 2013, 2015 NIL  4/2019 abnormal pap? No records  1/25/21 NIL pap, neg HPV. Plan: cotest in 1 year  1/6/22 Reminder mychart and letter  2/9/22 Reminder call -- Pt notified   03/10/22 Lost to follow-up for pap tracking, fyi routed to provider

## 2022-04-02 ENCOUNTER — HEALTH MAINTENANCE LETTER (OUTPATIENT)
Age: 42
End: 2022-04-02

## 2022-10-01 ENCOUNTER — HEALTH MAINTENANCE LETTER (OUTPATIENT)
Age: 42
End: 2022-10-01

## 2022-10-26 ENCOUNTER — HOSPITAL ENCOUNTER (OUTPATIENT)
Dept: MAMMOGRAPHY | Facility: CLINIC | Age: 42
Discharge: HOME | End: 2022-10-26
Attending: FAMILY MEDICINE | Admitting: FAMILY MEDICINE
Payer: COMMERCIAL

## 2022-10-26 DIAGNOSIS — Z12.31 VISIT FOR SCREENING MAMMOGRAM: ICD-10-CM

## 2022-10-26 PROCEDURE — 77067 SCR MAMMO BI INCL CAD: CPT

## 2022-11-03 ENCOUNTER — HOSPITAL ENCOUNTER (OUTPATIENT)
Dept: MAMMOGRAPHY | Facility: CLINIC | Age: 42
Discharge: HOME | End: 2022-11-03
Attending: FAMILY MEDICINE
Payer: COMMERCIAL

## 2022-11-03 DIAGNOSIS — N64.89 BREAST ASYMMETRY: ICD-10-CM

## 2022-11-03 PROCEDURE — 77061 BREAST TOMOSYNTHESIS UNI: CPT | Mod: RT

## 2022-11-03 PROCEDURE — 76642 ULTRASOUND BREAST LIMITED: CPT | Mod: RT

## 2022-12-14 ENCOUNTER — TRANSFERRED RECORDS (OUTPATIENT)
Dept: HEALTH INFORMATION MANAGEMENT | Facility: CLINIC | Age: 42
End: 2022-12-14

## 2023-05-02 ASSESSMENT — ENCOUNTER SYMPTOMS
DIARRHEA: 0
DIZZINESS: 0
CHILLS: 0
HEADACHES: 0
FREQUENCY: 0
PALPITATIONS: 0
HEMATOCHEZIA: 0
ARTHRALGIAS: 0
MYALGIAS: 0
WEAKNESS: 0
BREAST MASS: 0
DYSURIA: 0
CONSTIPATION: 0
HEARTBURN: 0
HEMATURIA: 0
ABDOMINAL PAIN: 0
SHORTNESS OF BREATH: 0
SORE THROAT: 0
NERVOUS/ANXIOUS: 0
EYE PAIN: 0
NAUSEA: 0
PARESTHESIAS: 0
JOINT SWELLING: 0
FEVER: 0
COUGH: 0

## 2023-05-09 ENCOUNTER — OFFICE VISIT (OUTPATIENT)
Dept: FAMILY MEDICINE | Facility: CLINIC | Age: 43
End: 2023-05-09
Payer: COMMERCIAL

## 2023-05-09 VITALS
HEART RATE: 81 BPM | OXYGEN SATURATION: 99 % | RESPIRATION RATE: 14 BRPM | DIASTOLIC BLOOD PRESSURE: 72 MMHG | SYSTOLIC BLOOD PRESSURE: 122 MMHG | WEIGHT: 155 LBS | BODY MASS INDEX: 26.46 KG/M2 | TEMPERATURE: 97.8 F | HEIGHT: 64 IN

## 2023-05-09 DIAGNOSIS — Z00.00 ENCOUNTER FOR ROUTINE HISTORY AND PHYSICAL EXAMINATION OF ADULT: Primary | ICD-10-CM

## 2023-05-09 DIAGNOSIS — K21.9 GASTROESOPHAGEAL REFLUX DISEASE, UNSPECIFIED WHETHER ESOPHAGITIS PRESENT: ICD-10-CM

## 2023-05-09 DIAGNOSIS — Z12.4 CERVICAL CANCER SCREENING: ICD-10-CM

## 2023-05-09 DIAGNOSIS — Z13.220 LIPID SCREENING: ICD-10-CM

## 2023-05-09 DIAGNOSIS — Z13.1 SCREENING FOR DIABETES MELLITUS: ICD-10-CM

## 2023-05-09 LAB
ERYTHROCYTE [DISTWIDTH] IN BLOOD BY AUTOMATED COUNT: 11.7 % (ref 10–15)
HCT VFR BLD AUTO: 41.2 % (ref 35–47)
HGB BLD-MCNC: 13.6 G/DL (ref 11.7–15.7)
MCH RBC QN AUTO: 30.3 PG (ref 26.5–33)
MCHC RBC AUTO-ENTMCNC: 33 G/DL (ref 31.5–36.5)
MCV RBC AUTO: 92 FL (ref 78–100)
PLATELET # BLD AUTO: 215 10E3/UL (ref 150–450)
RBC # BLD AUTO: 4.49 10E6/UL (ref 3.8–5.2)
WBC # BLD AUTO: 5.9 10E3/UL (ref 4–11)

## 2023-05-09 PROCEDURE — 80061 LIPID PANEL: CPT | Performed by: FAMILY MEDICINE

## 2023-05-09 PROCEDURE — 85027 COMPLETE CBC AUTOMATED: CPT | Performed by: FAMILY MEDICINE

## 2023-05-09 PROCEDURE — 36415 COLL VENOUS BLD VENIPUNCTURE: CPT | Performed by: FAMILY MEDICINE

## 2023-05-09 PROCEDURE — 99396 PREV VISIT EST AGE 40-64: CPT | Performed by: FAMILY MEDICINE

## 2023-05-09 PROCEDURE — G0145 SCR C/V CYTO,THINLAYER,RESCR: HCPCS | Performed by: FAMILY MEDICINE

## 2023-05-09 PROCEDURE — 87624 HPV HI-RISK TYP POOLED RSLT: CPT | Performed by: FAMILY MEDICINE

## 2023-05-09 PROCEDURE — 82947 ASSAY GLUCOSE BLOOD QUANT: CPT | Performed by: FAMILY MEDICINE

## 2023-05-09 RX ORDER — PANTOPRAZOLE SODIUM 20 MG/1
20 TABLET, DELAYED RELEASE ORAL DAILY
Qty: 90 TABLET | Refills: 3 | Status: SHIPPED | OUTPATIENT
Start: 2023-05-09

## 2023-05-09 ASSESSMENT — ENCOUNTER SYMPTOMS
HEARTBURN: 0
HEMATOCHEZIA: 0
DIZZINESS: 0
PARESTHESIAS: 0
DIARRHEA: 0
JOINT SWELLING: 0
FEVER: 0
DYSURIA: 0
ARTHRALGIAS: 0
FREQUENCY: 0
CHILLS: 0
SHORTNESS OF BREATH: 0
SORE THROAT: 0
EYE PAIN: 0
WEAKNESS: 0
HEADACHES: 0
HEMATURIA: 0
NERVOUS/ANXIOUS: 0
BREAST MASS: 0
NAUSEA: 0
MYALGIAS: 0
CONSTIPATION: 0
ABDOMINAL PAIN: 0
PALPITATIONS: 0
COUGH: 0

## 2023-05-09 NOTE — PATIENT INSTRUCTIONS
Pepcid (famotidine) is a bit safer to take chronically, less risk of pneumonia and osteoporosis. It's quite affordable over the counter, as [employer], places like this.

## 2023-05-10 LAB
CHOLEST SERPL-MCNC: 154 MG/DL
FASTING STATUS PATIENT QL REPORTED: YES
GLUCOSE SERPL-MCNC: 91 MG/DL (ref 70–99)
HDLC SERPL-MCNC: 69 MG/DL
LDLC SERPL CALC-MCNC: 73 MG/DL
NONHDLC SERPL-MCNC: 85 MG/DL
TRIGL SERPL-MCNC: 59 MG/DL

## 2023-05-11 LAB
BKR LAB AP GYN ADEQUACY: NORMAL
BKR LAB AP GYN INTERPRETATION: NORMAL
BKR LAB AP HPV REFLEX: NORMAL
BKR LAB AP PREVIOUS ABNL DX: NORMAL
BKR LAB AP PREVIOUS ABNORMAL: NORMAL
PATH REPORT.COMMENTS IMP SPEC: NORMAL
PATH REPORT.COMMENTS IMP SPEC: NORMAL
PATH REPORT.RELEVANT HX SPEC: NORMAL

## 2023-05-15 LAB
HUMAN PAPILLOMA VIRUS 16 DNA: NEGATIVE
HUMAN PAPILLOMA VIRUS 18 DNA: NEGATIVE
HUMAN PAPILLOMA VIRUS FINAL DIAGNOSIS: NORMAL
HUMAN PAPILLOMA VIRUS OTHER HR: NEGATIVE

## 2023-10-23 ENCOUNTER — ALLIED HEALTH/NURSE VISIT (OUTPATIENT)
Dept: FAMILY MEDICINE | Facility: CLINIC | Age: 43
End: 2023-10-23
Payer: COMMERCIAL

## 2023-10-23 DIAGNOSIS — Z23 ENCOUNTER FOR IMMUNIZATION: Primary | ICD-10-CM

## 2023-10-23 PROCEDURE — 99207 PR NO CHARGE NURSE ONLY: CPT

## 2023-10-23 PROCEDURE — 90471 IMMUNIZATION ADMIN: CPT

## 2023-10-23 PROCEDURE — 90715 TDAP VACCINE 7 YRS/> IM: CPT

## 2023-10-23 NOTE — PROGRESS NOTES
Prior to immunization administration, verified patients identity using patient s name and date of birth. Please see Immunization Activity for additional information.     Screening Questionnaire for Adult Immunization    Are you sick today?   No   Do you have allergies to medications, food, a vaccine component or latex?   No   Have you ever had a serious reaction after receiving a vaccination?   No   Do you have a long-term health problem with heart, lung, kidney, or metabolic disease (e.g., diabetes), asthma, a blood disorder, no spleen, complement component deficiency, a cochlear implant, or a spinal fluid leak?  Are you on long-term aspirin therapy?   No   Do you have cancer, leukemia, HIV/AIDS, or any other immune system problem?   No   Do you have a parent, brother, or sister with an immune system problem?   Mom, psoriasis   In the past 3 months, have you taken medications that affect  your immune system, such as prednisone, other steroids, or anticancer drugs; drugs for the treatment of rheumatoid arthritis, Crohn s disease, or psoriasis; or have you had radiation treatments?   No   Have you had a seizure, or a brain or other nervous system problem?   No   During the past year, have you received a transfusion of blood or blood    products, or been given immune (gamma) globulin or antiviral drug?   No   For women: Are you pregnant or is there a chance you could become       pregnant during the next month?   No   Have you received any vaccinations in the past 4 weeks?   No     Immunization questionnaire was positive for at least one answer.    I have reviewed the following standing orders:   This patient is due and qualifies for a TDAP vaccine.    Click here for Tdap Standing Order    I have reviewed the vaccines inclusion and exclusion criteria; No concerns regarding eligibility.     Patient instructed to remain in clinic for 15 minutes afterwards, and to report any adverse reactions.     Screening performed by  JUWAN GALEANA LPN on 10/23/2023 at 1:29 PM.

## 2023-11-08 ENCOUNTER — HOSPITAL ENCOUNTER (OUTPATIENT)
Dept: MAMMOGRAPHY | Facility: CLINIC | Age: 43
Discharge: HOME | End: 2023-11-08
Attending: FAMILY MEDICINE | Admitting: FAMILY MEDICINE
Payer: COMMERCIAL

## 2023-11-08 DIAGNOSIS — Z12.31 VISIT FOR SCREENING MAMMOGRAM: ICD-10-CM

## 2023-11-08 PROCEDURE — 77067 SCR MAMMO BI INCL CAD: CPT

## 2024-01-14 ENCOUNTER — E-VISIT (OUTPATIENT)
Dept: FAMILY MEDICINE | Facility: CLINIC | Age: 44
End: 2024-01-14
Payer: COMMERCIAL

## 2024-01-14 DIAGNOSIS — J01.90 ACUTE NON-RECURRENT SINUSITIS, UNSPECIFIED LOCATION: Primary | ICD-10-CM

## 2024-01-14 PROCEDURE — 99421 OL DIG E/M SVC 5-10 MIN: CPT | Performed by: FAMILY MEDICINE

## 2024-01-15 NOTE — TELEPHONE ENCOUNTER
Provider E-Visit time total (minutes): 3      Assessment:   The primary encounter diagnosis was (J01.90) Acute non-recurrent sinusitis, unspecified location  (primary encounter diagnosis)  Comment:   Plan: amoxicillin-clavulanate (AUGMENTIN) 875-125 MG         tablet              Plan:   No orders of the defined types were placed in this encounter.      Patient Instructions   Thank you for choosing us for your care. I have placed an order for a prescription so that you can start treatment. View your full visit summary for details by clicking on the link below. Your pharmacist will able to address any questions you may have about the medication.     If you're not feeling better within 5-7 days, please schedule an appointment.  You can schedule an appointment right here in [facility], or call [phone]  If the visit is for the same symptoms as your eVisit, we'll refund the cost of your eVisit if seen within seven days.        Subjective:   Delia Yusuf is a 43 year old female who submitted an eVisit request for evaluation of   Chief Complaint   Patient presents with    Other     Entered automatically based on patient selection in [facility].       See the questionnaire and message section of encounter report for information related to history of present illness and review of systems.    Portions of the patient's history were reviewed and updated as appropriate.     Objective:   No exam performed today, patient submitted as eVisit.

## 2024-01-15 NOTE — PATIENT INSTRUCTIONS
Thank you for choosing us for your care. I have placed an order for a prescription so that you can start treatment. View your full visit summary for details by clicking on the link below. Your pharmacist will able to address any questions you may have about the medication.     If you're not feeling better within 5-7 days, please schedule an appointment.  You can schedule an appointment right here in [facility], or call [phone]  If the visit is for the same symptoms as your eVisit, we'll refund the cost of your eVisit if seen within seven days.

## 2024-07-21 ENCOUNTER — HEALTH MAINTENANCE LETTER (OUTPATIENT)
Age: 44
End: 2024-07-21

## 2024-09-23 ENCOUNTER — TRANSFERRED RECORDS (OUTPATIENT)
Dept: HEALTH INFORMATION MANAGEMENT | Facility: CLINIC | Age: 44
End: 2024-09-23
Payer: COMMERCIAL

## 2024-10-10 ENCOUNTER — TRANSFERRED RECORDS (OUTPATIENT)
Dept: HEALTH INFORMATION MANAGEMENT | Facility: CLINIC | Age: 44
End: 2024-10-10
Payer: COMMERCIAL

## 2024-10-15 ENCOUNTER — OFFICE VISIT (OUTPATIENT)
Dept: FAMILY MEDICINE | Facility: CLINIC | Age: 44
End: 2024-10-15
Payer: COMMERCIAL

## 2024-10-15 VITALS
HEART RATE: 72 BPM | DIASTOLIC BLOOD PRESSURE: 74 MMHG | BODY MASS INDEX: 27.31 KG/M2 | SYSTOLIC BLOOD PRESSURE: 110 MMHG | TEMPERATURE: 98.4 F | OXYGEN SATURATION: 98 % | HEIGHT: 64 IN | WEIGHT: 160 LBS | RESPIRATION RATE: 17 BRPM

## 2024-10-15 DIAGNOSIS — M25.311 SHOULDER INSTABILITY, RIGHT: ICD-10-CM

## 2024-10-15 DIAGNOSIS — Z01.818 PREOP GENERAL PHYSICAL EXAM: Primary | ICD-10-CM

## 2024-10-15 PROCEDURE — 99213 OFFICE O/P EST LOW 20 MIN: CPT | Performed by: FAMILY MEDICINE

## 2024-10-15 NOTE — PROGRESS NOTES
Preoperative Evaluation  [facility]  [address]  [place] [facility] [address]  Phone: [phone]  Fax: [phone]  Primary Provider: Brittni Simms MD  Pre-op Performing Provider: Brittni Simms MD  Oct 15, 2024             10/14/2024   Surgical Information   What procedure is being done? Right shoulder arthroscopy, anterior Bankart repair, SLAP repair and possible remplissage   Facility or Hospital where procedure/surgery will be performed: [facility]   Who is doing the procedure / surgery? Dr. Nicole   Date of surgery / procedure: 10/24/2024   Time of surgery / procedure: TBD   Where do you plan to recover after surgery? at home with family        Fax number for surgical facility: [facility]    Assessment & Plan     The proposed surgical procedure is considered INTERMEDIATE risk.    Preop general physical exam  -Medically optimized for her procedure at this time.  Proceed with surgery as deemed medically necessary and appropriate by anesthesia and orthopedics.  -Patient is on no medications, understands she will need a pregnancy test when she presents to the surgery center as it is more than 7 days from now.    Shoulder instability, right  -Per above.        - No identified additional risk factors other than previously addressed    Preoperative Medication Instructions  Antiplatelet or Anticoagulation Medication Instructions   - Patient is on no antiplatelet or anticoagulation medications.    Additional Medication Instructions  Patient is on no additional chronic medications    Recommendation  Approval given to proceed with proposed procedure, without further diagnostic evaluation.    Elizabeth Lin is a 44 year old, presenting for the following:  Pre-Op Exam (DOS 10/24/24, Right shoulder arthroscopy, anterior Bankart repair, SLAP repair and possible remplissage, Dr. Nicole, [facility]  [place])          10/15/2024    11:12 AM   Additional Questions   Roomed by Shima TEJADA related to upcoming procedure:     She comes in today for a pre-op prior to having a repeat shoulder surgery.She had her initial injury on the warped wall. She does find that she will recurrent dislocations and does wake up at night with it dislocated at times which keeps tearing her shoulder.         10/14/2024   Pre-Op Questionnaire   Have you ever had a heart attack or stroke? No   Have you ever had surgery on your heart or blood vessels, such as a stent placement, a coronary artery bypass, or surgery on an artery in your head, neck, heart, or legs? No   Do you have chest pain with activity? No   Do you have a history of heart failure? No   Do you currently have a cold, bronchitis or symptoms of other infection? No   Do you have a cough, shortness of breath, or wheezing? No   Do you or anyone in your family have previous history of blood clots? No   Do you or does anyone in your family have a serious bleeding problem such as prolonged bleeding following surgeries or cuts? No   Have you ever had problems with anemia or been told to take iron pills? No   Have you had any abnormal blood loss such as black, tarry or bloody stools, or abnormal vaginal bleeding? No   Have you ever had a blood transfusion? No   Are you willing to have a blood transfusion if it is medically needed before, during, or after your surgery? Yes   Have you or any of your relatives ever had problems with anesthesia? No   Do you have sleep apnea, excessive snoring or daytime drowsiness? No   Do you have any artifical heart valves or other implanted medical devices like a pacemaker, defibrillator, or continuous glucose monitor? No   Do you have artificial joints? No   Are you allergic to latex? No        Health Care Directive  Patient does not have a Health Care Directive or Living Will: Discussed advance care planning with patient; information given to  patient to review.    Preoperative Review of    reviewed - no record of controlled substances prescribed.        Patient Active Problem List    Diagnosis Date Noted    Abnormal Pap smear of cervix 2019     Priority: Medium     2010, 2013, 2015 NIL  4/2019 abnormal pap? No records  1/25/21 NIL pap, neg HPV. Plan: cotest in 1 year  03/10/22 Lost to follow-up for pap tracking, fyi routed to provider  05/09/23 NIL Pap, Neg HR HPV Plan cotest in 3 years        Past Medical History:   Diagnosis Date    Abnormal Pap smear of cervix 1/1/2019 2010, 2013, 2015 NIL 4/2019 abnormal pap? No records 1/25/21 NIL pap, neg HPV. Plan: await provider     Past Surgical History:   Procedure Laterality Date    ENDOMETRIAL ABLATION  05/2019    Dr Yadi Villanueva     REMOVAL OF OVARIAN CYST(S) Right 03/2006    [place], [place]  Dr Sowmya Tello    SHOULDER ARTHROSCOPY W/ BANKHART PROCEDURE Right     WISDOM TOOTH EXTRACTION       Current Outpatient Medications   Medication Sig Dispense Refill    amoxicillin-clavulanate (AUGMENTIN) 875-125 MG tablet Take 1 tablet by mouth 2 times daily 14 tablet 0    pantoprazole (PROTONIX) 20 MG EC tablet Take 1 tablet (20 mg) by mouth daily Take 30 minutes before breakfast (Patient not taking: Reported on 10/15/2024) 90 tablet 3       No Known Allergies     Social History     Tobacco Use    Smoking status: Never    Smokeless tobacco: Never   Substance Use Topics    Alcohol use: Yes     Family History   Problem Relation Age of Onset    Cancer Paternal Grandfather         Bladder cancer and skin cancer    Hypertension Paternal Grandfather     Melanoma Paternal Grandmother     Hypertension Paternal Grandmother     Cerebrovascular Disease Maternal Grandfather          2 STROKES    Hypertension Maternal Grandfather     Hypertension Maternal Grandmother     Depression Sister     No Known Problems Mother     No Known Problems Father     No Known Problems Brother     No Known Problems Sister  "     History   Drug Use No             Review of Systems  Constitutional, HEENT, cardiovascular, pulmonary, gi and gu systems are negative, except as otherwise noted.    Objective    /74   Pulse 72   Temp 98.4  F (36.9  C)   Resp 17   Ht 1.613 m (5' 3.5\")   Wt 72.6 kg (160 lb)   LMP 10/15/2024   SpO2 98%   BMI 27.90 kg/m     Estimated body mass index is 27.9 kg/m  as calculated from the following:    Height as of this encounter: 1.613 m (5' 3.5\").    Weight as of this encounter: 72.6 kg (160 lb).  GENERAL: alert and no distress  EYES: Eyes grossly normal to inspection, PERRL and conjunctivae and sclerae normal  HENT: ear canals and TM's normal, nose and mouth without ulcers or lesions  NECK: no adenopathy, no asymmetry, masses, or scars  RESP: lungs clear to auscultation - no rales, rhonchi or wheezes  CV: regular rate and rhythm, normal S1 S2, no S3 or S4, no murmur, click or rub, no peripheral edema  ABDOMEN: soft, nontender, no hepatosplenomegaly, no masses and bowel sounds normal  MS: no gross musculoskeletal defects noted, no edema  MS: right shoulder not examined today  SKIN: no suspicious lesions or rashes  NEURO: Normal strength and tone, mentation intact and speech normal  PSYCH: mentation appears normal, affect normal/bright    No results for input(s): \"HGB\", \"PLT\", \"INR\", \"NA\", \"POTASSIUM\", \"CR\", \"A1C\" in the last 8760 hours.     Diagnostics  No labs were ordered during this visit.   No EKG required, no history of coronary heart disease, significant arrhythmia, peripheral arterial disease or other structural heart disease.    Revised Cardiac Risk Index (RCRI)  The patient has the following serious cardiovascular risks for perioperative complications:   - No serious cardiac risks = 0 points     RCRI Interpretation: 0 points: Class I (very low risk - 0.4% complication rate)         Signed Electronically by: Brittni Simms MD  A copy of this evaluation report is provided to the requesting " physician.

## 2024-10-15 NOTE — PATIENT INSTRUCTIONS
How to Take Your Medication Before Surgery  Preoperative Medication Instructions   Antiplatelet or Anticoagulation Medication Instructions   - Patient is on no antiplatelet or anticoagulation medications.    Additional Medication Instructions  Patient is on no additional chronic medications       Patient Education   Preparing for Your Surgery  For Adults  Getting started  In most cases, a nurse will call to review your health history and instructions. They will give you an arrival time based on your scheduled surgery time. Please be ready to share:  Your doctor's clinic name and phone number  Your medical, surgical, and anesthesia history  A list of allergies and sensitivities  A list of medicines, including herbal treatments and over-the-counter drugs  Whether the patient has a legal guardian (ask how to send us the papers in advance)  Note: You may not receive a call if you were seen at our PAC (Preoperative Assessment Center).  Please tell us if you're pregnant--or if there's any chance you might be pregnant. Some surgeries may injure a fetus (unborn baby), so they require a pregnancy test. Surgeries that are safe for a fetus don't always need a test, and you can choose whether to have one.   Preparing for surgery  Within 10 to 30 days of surgery: Have a pre-op exam (sometimes called an H&P, or History and Physical). This can be done at a clinic or pre-operative center.  If you're having a , you may not need this exam. Talk to your care team.  At your pre-op exam, talk to your care team about all medicines you take. (This includes CBD oil and any drugs, such as THC, marijuana, and other forms of cannabis.) If you need to stop any medicine before surgery, ask when to start taking it again.  This is for your safety. Many medicines and drugs can make you bleed too much during surgery. Some change how well surgery (anesthesia) drugs work.  Call your insurance company to let them know you're having surgery.  (If you don't have insurance, call [phone].)  Call your clinic if there's any change in your health. This includes a scrape or scratch near the surgery site, or any signs of a cold (sore throat, runny nose, cough, rash, fever).  Eating and drinking guidelines  For your safety: Unless your surgeon tells you otherwise, follow the guidelines below.  Eat and drink as normal until 8 hours before you arrive for surgery. After that, no food or milk. You can spit out gum when you arrive.  Drink clear liquids until 2 hours before you arrive. These are liquids you can see through, like water, Gatorade, and Propel Water. They also include plain black coffee and tea (no cream or milk).  No alcohol for 24 hours before you arrive. The night before surgery, stop any drinks that contain THC.  If your care team tells you to take medicine on the morning of surgery, it's okay to take it with a sip of water. No other medicines or drugs are allowed (including CBD oil)--follow your care team's instructions.  If you have questions the day of surgery, call your hospital or surgery center.   Preventing infection  Shower or bathe the night before and the morning of surgery. Follow the instructions your clinic gave you. (If no instructions, use regular soap.)  Don't shave or clip hair near your surgery site. We'll remove the hair if needed.  Don't smoke or vape the morning of surgery. No chewing tobacco for 6 hours before you arrive. A nicotine patch is okay. You may spit out nicotine gum when you arrive.  For some surgeries, the surgeon will tell you to fully quit smoking and nicotine.  We will make every effort to keep you safe from infection. We will:  Clean our hands often with soap and water (or an alcohol-based hand rub).  Clean the skin at your surgery site with a special soap that kills germs.  Give you a special gown to keep you warm. (Cold raises the risk of infection.)  Wear hair covers, masks, gowns, and gloves during  surgery.  Give antibiotic medicine, if prescribed. Not all surgeries need this medicine.  What to bring on the day of surgery  Photo ID and insurance card  Copy of your health care directive, if you have one  Glasses and hearing aids (bring cases)  You can't wear contacts during surgery  Inhaler and eye drops, if you use them (tell us about these when you arrive)  CPAP machine or breathing device, if you use them  A few personal items, if spending the night  If you have . . .  A pacemaker, ICD (cardiac defibrillator), or other implant: Bring the ID card.  An implanted stimulator: Bring the remote control.  A legal guardian: Bring a copy of the certified (court-stamped) guardianship papers.  Please remove any jewelry, including body piercings. Leave jewelry and other valuables at home.  If you're going home the day of surgery  You must have a responsible adult drive you home. They should stay with you overnight as well.  If you don't have someone to stay with you, and you aren't safe to go home alone, we may keep you overnight. Insurance often won't pay for this.  After surgery  If it's hard to control your pain or you need more pain medicine, please call your surgeon's office.  Questions?   If you have any questions for your care team, list them here:   ____________________________________________________________________________________________________________________________________________________________________________________________________________________________________________________________  For informational purposes only. Not to replace the advice of your health care provider. Copyright   2003, 2019 [facility]. All rights reserved. Clinically reviewed by Luis Armando Hunt MD. [employer] [id number] - REV 08/24.

## 2024-10-22 ENCOUNTER — HOSPITAL ENCOUNTER (OUTPATIENT)
Dept: MAMMOGRAPHY | Facility: CLINIC | Age: 44
Discharge: HOME | End: 2024-10-22
Attending: FAMILY MEDICINE | Admitting: FAMILY MEDICINE
Payer: COMMERCIAL

## 2024-10-22 DIAGNOSIS — Z12.31 VISIT FOR SCREENING MAMMOGRAM: ICD-10-CM

## 2024-10-22 PROCEDURE — 77063 BREAST TOMOSYNTHESIS BI: CPT

## 2024-10-23 ENCOUNTER — HOSPITAL ENCOUNTER (OUTPATIENT)
Dept: MAMMOGRAPHY | Facility: CLINIC | Age: 44
Discharge: HOME | End: 2024-10-23
Attending: FAMILY MEDICINE
Payer: COMMERCIAL

## 2024-10-23 DIAGNOSIS — R92.8 ABNORMAL MAMMOGRAM: ICD-10-CM

## 2024-10-23 PROCEDURE — 77065 DX MAMMO INCL CAD UNI: CPT | Mod: LT

## 2024-10-23 PROCEDURE — 76642 ULTRASOUND BREAST LIMITED: CPT | Mod: LT

## 2024-11-07 ENCOUNTER — TRANSFERRED RECORDS (OUTPATIENT)
Dept: HEALTH INFORMATION MANAGEMENT | Facility: CLINIC | Age: 44
End: 2024-11-07
Payer: COMMERCIAL